# Patient Record
Sex: MALE | Employment: PART TIME | URBAN - METROPOLITAN AREA
[De-identification: names, ages, dates, MRNs, and addresses within clinical notes are randomized per-mention and may not be internally consistent; named-entity substitution may affect disease eponyms.]

---

## 2024-02-29 ENCOUNTER — OFFICE VISIT (OUTPATIENT)
Dept: ORTHOPEDIC SURGERY | Facility: HOSPITAL | Age: 54
End: 2024-02-29
Payer: COMMERCIAL

## 2024-02-29 DIAGNOSIS — G56.03 BILATERAL CARPAL TUNNEL SYNDROME: ICD-10-CM

## 2024-02-29 DIAGNOSIS — M75.101 TEAR OF RIGHT ROTATOR CUFF, UNSPECIFIED TEAR EXTENT, UNSPECIFIED WHETHER TRAUMATIC: Primary | ICD-10-CM

## 2024-02-29 PROCEDURE — 1036F TOBACCO NON-USER: CPT | Performed by: ORTHOPAEDIC SURGERY

## 2024-02-29 PROCEDURE — 99204 OFFICE O/P NEW MOD 45 MIN: CPT | Performed by: ORTHOPAEDIC SURGERY

## 2024-02-29 RX ORDER — CEFAZOLIN SODIUM 2 G/100ML
2 INJECTION, SOLUTION INTRAVENOUS ONCE
OUTPATIENT
Start: 2024-02-29 | End: 2024-02-29

## 2024-02-29 RX ORDER — SODIUM CHLORIDE, SODIUM LACTATE, POTASSIUM CHLORIDE, CALCIUM CHLORIDE 600; 310; 30; 20 MG/100ML; MG/100ML; MG/100ML; MG/100ML
100 INJECTION, SOLUTION INTRAVENOUS CONTINUOUS
OUTPATIENT
Start: 2024-02-29

## 2024-02-29 ASSESSMENT — PAIN SCALES - GENERAL: PAINLEVEL_OUTOF10: 0 - NO PAIN

## 2024-02-29 ASSESSMENT — ENCOUNTER SYMPTOMS
FEVER: 0
SHORTNESS OF BREATH: 0
WHEEZING: 0
ARTHRALGIAS: 1
FATIGUE: 0
CHILLS: 0

## 2024-02-29 ASSESSMENT — PAIN - FUNCTIONAL ASSESSMENT: PAIN_FUNCTIONAL_ASSESSMENT: 0-10

## 2024-02-29 NOTE — PROGRESS NOTES
Reason for Appointment  Chief Complaint   Patient presents with    Right Shoulder - Pain    Left Shoulder - Pain     History of Present Illness  New patient is a 53 y.o. male here today for evaluation of right shoulder pain. X-rays show mild AC joint DJD, no significant joint DJD. MRI 12/5/23 of the right shoulder shows a full-thickness cuff tear. He was evaluated by Dr. Musa at the [facility] who recommended a clean out. He saw another physician who recommended surgical repair. He believes that the shoulder injury was from weight lifting. Pain began over a year ago and worsened about 9 months ago. Certain quick movements cause pain in the shoulder. He does not have pain that radiates into the hand. Pain wakes him up at night. He went to therapy for the shoulder which did not help. He also has had steroid injections into both shoulders and knees.     History reviewed. No pertinent past medical history.    History reviewed. No pertinent surgical history.    Medication Documentation Review Audit       Reviewed by Annette Woodson MA (Medical Assistant) on 02/29/24 at 0829      Medication Order Taking? Sig Documenting Provider Last Dose Status            No Medications to Display                                   No Known Allergies    Review of Systems   Constitutional:  Negative for chills, fatigue and fever.   Respiratory:  Negative for shortness of breath and wheezing.    Cardiovascular:  Negative for chest pain and leg swelling.   Musculoskeletal:  Positive for arthralgias.   All other systems reviewed and are negative.    Exam   On exam, there is excellent cervical ROM, no bony tenderness, no scapular winging, no scapular tenderness, excellent forward flexion, definite weakness in external rotation right worse than left, mildly positive impingement sign on right, pain with lowering, mild right biceps tenderness, no AC joint tenderness, excellent cuff strength on the left, good biceps and triceps strength, good  wrist flexion and extension strength, numbness in right index from a previous injury, positive Tinel's bilateral median nerves, no atrophy, good cap refill, no hyperreflexia, negative Rowan's sign, mild DJD in the hands but good ROM, no triggering.     Assessment   Encounter Diagnoses   Name Primary?    Tear of right rotator cuff, unspecified tear extent, unspecified whether traumatic Yes    Bilateral carpal tunnel syndrome        Plan   We had a long discussion about his options and surgical intervention. We had a long discussion about the risks, benefits, and alternatives to surgery. Patient understands the procedure and recovery fully. We did discuss that there is a chance for biceps tenodesis as well.  Plan for right shoulder arthroscopic rotator cuff repair. The numbness in his hands is not a major concern at present, and he will follow-up after his shoulder repair to discuss treatment.     I, Prema Garzon, attest that this documentation has been prepared under the direction and in the presence of Jace Aguilera MD. By signing below, I, Jace Aguilera MD, personally performed the services described in this documentation. All medical record entries made by the scribe were at my direction and in my presence. I have reviewed the chart and agree that the record reflects my personal performance and is accurate and complete. 02/29/24